# Patient Record
Sex: FEMALE | Race: BLACK OR AFRICAN AMERICAN | Employment: FULL TIME | URBAN - METROPOLITAN AREA
[De-identification: names, ages, dates, MRNs, and addresses within clinical notes are randomized per-mention and may not be internally consistent; named-entity substitution may affect disease eponyms.]

---

## 2018-01-12 ENCOUNTER — HOSPITAL ENCOUNTER (OUTPATIENT)
Dept: LAB | Age: 43
Discharge: HOME | End: 2018-01-12
Payer: COMMERCIAL

## 2018-01-12 ENCOUNTER — HOSPITAL ENCOUNTER (OUTPATIENT)
Dept: NON INVASIVE DIAGNOSTICS | Age: 43
Discharge: HOME | End: 2018-01-12
Payer: COMMERCIAL

## 2018-01-12 DIAGNOSIS — E66.01 MORBID OBESITY (HCC): ICD-10-CM

## 2018-01-12 LAB
25(OH)D3 SERPL-MCNC: 7.8 NG/ML (ref 30–100)
ALBUMIN SERPL-MCNC: 3.9 G/DL (ref 3.4–5)
ALBUMIN/GLOB SERPL: 1.1 {RATIO} (ref 0.8–1.7)
ALP SERPL-CCNC: 52 U/L (ref 45–117)
ALT SERPL-CCNC: 22 U/L (ref 13–56)
ANION GAP SERPL CALC-SCNC: 10 MMOL/L (ref 3–18)
AST SERPL-CCNC: 14 U/L (ref 15–37)
ATRIAL RATE: 81 BPM
BILIRUB SERPL-MCNC: 0.4 MG/DL (ref 0.2–1)
BUN SERPL-MCNC: 12 MG/DL (ref 7–18)
BUN/CREAT SERPL: 20 (ref 12–20)
CALCIUM SERPL-MCNC: 9.1 MG/DL (ref 8.5–10.1)
CALCULATED P AXIS, ECG09: 56 DEGREES
CALCULATED R AXIS, ECG10: 54 DEGREES
CALCULATED T AXIS, ECG11: 53 DEGREES
CHLORIDE SERPL-SCNC: 104 MMOL/L (ref 100–108)
CHOLEST SERPL-MCNC: 211 MG/DL
CO2 SERPL-SCNC: 27 MMOL/L (ref 21–32)
CREAT SERPL-MCNC: 0.61 MG/DL (ref 0.6–1.3)
DIAGNOSIS, 93000: NORMAL
ERYTHROCYTE [DISTWIDTH] IN BLOOD BY AUTOMATED COUNT: 15.2 % (ref 11.6–14.5)
EST. AVERAGE GLUCOSE BLD GHB EST-MCNC: 120 MG/DL
GLOBULIN SER CALC-MCNC: 3.7 G/DL (ref 2–4)
GLUCOSE SERPL-MCNC: 76 MG/DL (ref 74–99)
HBA1C MFR BLD: 5.8 % (ref 4.5–5.6)
HCT VFR BLD AUTO: 36.7 % (ref 35–45)
HDLC SERPL-MCNC: 49 MG/DL (ref 40–60)
HDLC SERPL: 4.3 {RATIO} (ref 0–5)
HGB BLD-MCNC: 12.1 G/DL (ref 12–16)
LDLC SERPL CALC-MCNC: 153.4 MG/DL (ref 0–100)
LIPID PROFILE,FLP: ABNORMAL
MAGNESIUM SERPL-MCNC: 1.8 MG/DL (ref 1.6–2.6)
MCH RBC QN AUTO: 27.8 PG (ref 24–34)
MCHC RBC AUTO-ENTMCNC: 33 G/DL (ref 31–37)
MCV RBC AUTO: 84.2 FL (ref 74–97)
P-R INTERVAL, ECG05: 150 MS
PHOSPHATE SERPL-MCNC: 4.1 MG/DL (ref 2.5–4.9)
PLATELET # BLD AUTO: 311 K/UL (ref 135–420)
PMV BLD AUTO: 8.6 FL (ref 9.2–11.8)
POTASSIUM SERPL-SCNC: 3.9 MMOL/L (ref 3.5–5.5)
PROT SERPL-MCNC: 7.6 G/DL (ref 6.4–8.2)
Q-T INTERVAL, ECG07: 390 MS
QRS DURATION, ECG06: 78 MS
QTC CALCULATION (BEZET), ECG08: 453 MS
RBC # BLD AUTO: 4.36 M/UL (ref 4.2–5.3)
SODIUM SERPL-SCNC: 141 MMOL/L (ref 136–145)
TRIGL SERPL-MCNC: 43 MG/DL (ref ?–150)
TSH SERPL DL<=0.05 MIU/L-ACNC: 0.49 UIU/ML (ref 0.36–3.74)
URATE SERPL-MCNC: 4.5 MG/DL (ref 2.6–7.2)
VENTRICULAR RATE, ECG03: 81 BPM
VLDLC SERPL CALC-MCNC: 8.6 MG/DL
WBC # BLD AUTO: 10.5 K/UL (ref 4.6–13.2)

## 2018-01-12 PROCEDURE — 84443 ASSAY THYROID STIM HORMONE: CPT | Performed by: NURSE PRACTITIONER

## 2018-01-12 PROCEDURE — 83036 HEMOGLOBIN GLYCOSYLATED A1C: CPT | Performed by: NURSE PRACTITIONER

## 2018-01-12 PROCEDURE — 84550 ASSAY OF BLOOD/URIC ACID: CPT | Performed by: NURSE PRACTITIONER

## 2018-01-12 PROCEDURE — 83735 ASSAY OF MAGNESIUM: CPT | Performed by: NURSE PRACTITIONER

## 2018-01-12 PROCEDURE — 84100 ASSAY OF PHOSPHORUS: CPT | Performed by: NURSE PRACTITIONER

## 2018-01-12 PROCEDURE — 36415 COLL VENOUS BLD VENIPUNCTURE: CPT | Performed by: NURSE PRACTITIONER

## 2018-01-12 PROCEDURE — 85027 COMPLETE CBC AUTOMATED: CPT | Performed by: NURSE PRACTITIONER

## 2018-01-12 PROCEDURE — 82306 VITAMIN D 25 HYDROXY: CPT | Performed by: NURSE PRACTITIONER

## 2018-01-12 PROCEDURE — 80053 COMPREHEN METABOLIC PANEL: CPT | Performed by: NURSE PRACTITIONER

## 2018-01-12 PROCEDURE — 80061 LIPID PANEL: CPT | Performed by: NURSE PRACTITIONER

## 2018-01-12 PROCEDURE — 93005 ELECTROCARDIOGRAM TRACING: CPT

## 2018-01-16 RX ORDER — ERGOCALCIFEROL 1.25 MG/1
50000 CAPSULE ORAL
Qty: 52 CAP | Refills: 0 | Status: SHIPPED | OUTPATIENT
Start: 2018-01-16 | End: 2019-01-09

## 2018-01-25 ENCOUNTER — OFFICE VISIT (OUTPATIENT)
Dept: SURGERY | Age: 43
End: 2018-01-25

## 2018-01-25 VITALS
RESPIRATION RATE: 16 BRPM | HEIGHT: 63 IN | WEIGHT: 260.25 LBS | HEART RATE: 93 BPM | DIASTOLIC BLOOD PRESSURE: 70 MMHG | SYSTOLIC BLOOD PRESSURE: 119 MMHG | BODY MASS INDEX: 46.11 KG/M2 | OXYGEN SATURATION: 100 %

## 2018-01-25 DIAGNOSIS — E66.01 MORBID OBESITY (HCC): Primary | ICD-10-CM

## 2018-01-25 DIAGNOSIS — E66.01 MORBID OBESITY WITH BMI OF 45.0-49.9, ADULT (HCC): ICD-10-CM

## 2018-01-25 DIAGNOSIS — E55.9 VITAMIN D DEFICIENCY: ICD-10-CM

## 2018-01-25 RX ORDER — BISMUTH SUBSALICYLATE 262 MG
1 TABLET,CHEWABLE ORAL DAILY
COMMUNITY

## 2018-01-25 NOTE — PROGRESS NOTES
VLCD Progress Note: Initial Physician Visit    Phill Wong is a 40 y.o. female who is here for medical screening for the VLCD Program.      Weight History  Current weight 260 pounds Body mass index is 46.1 kg/(m^2). Goal weight 190 pounds  Highest weight 32, at 30years old    Weight loss History  How many weight loss attempts have you had?  'several over th years'  Which program were you most successful at? [address], but did not care for jitteriness from phentermine    Significant Medical History  MI w/ in the last 3 months: no  Type I Diabetes: no  Type II Diabetes: no  Liver or Kidney disease requiring protein restriction: no  Pregnant or planning on becoming pregnant w/in 6 months: no  Recent treatment for Cancer: no, cancer free 4 years; hx of colon ca  History of Uric-acid Kidney Stone or elevated Uric Acid: no  Peptic ulcer disease not well controlled: no  Recent onset of Inflammatory Bowel Disease: no    If female:  No LMP recorded.     Significant Psychosocial History  Major lifestyle changes: yes, recent separation   Other commitments: no   Any potential unsupportive: no     Current psychotherapy: no  Ever hospitalized for psychiatric reasons: no  History of depression: no  History of suicidal ideation: no  Dramatic mood changes during dieting: no  History of binge eating: no  If yes, is there a history of purging: no    Social History  Social History   Substance Use Topics    Smoking status: Never Smoker    Smokeless tobacco: Never Used    Alcohol use No       Has Marni ever been told by a physician not to exercise: no    Does Marni know of any reason they shouldn't exercise: no  If yes, why? NA    Does Marni have any food allergies or sensitivities: yes , tree nuts and peanuts  Allergies include:    Allergies   Allergen Reactions    Latex, Natural Rubber Rash    Amoxicillin Rash and Itching    Biaxin [Clarithromycin] Rash    Nuts [Tree Nut] Itching and Sneezing Objective    Visit Vitals    /70 (BP 1 Location: Right arm, BP Patient Position: Sitting)    Pulse 93    Resp 16    Ht 5' 3\" (1.6 m)    Wt 118 kg (260 lb 4 oz)    SpO2 100%    BMI 46.1 kg/m2     Appearance: Obese, A&O x 3, NAD  HEENT:  NC/AT, PERRL  Neck:  No nodes, no JVD  Heart:  RRR without M/R/G  Lungs:  CTAB, no rhonchi, rales, or wheezes with good air exchange   Abdomen:  Non-tender, pos bowel sounds, no hepatosplenomegally  Ext:  No C/C/E      Labs    Labs reviewed extensively with patient, will follow up with PCP concerning rechecking thryoid function in 6-12 months. Will continue with monthly CMP, uric acid checks    CBC:   Lab Results   Component Value Date/Time    WBC 10.5 01/12/2018 02:13 PM    RBC 4.36 01/12/2018 02:13 PM    HGB 12.1 01/12/2018 02:13 PM    HCT 36.7 01/12/2018 02:13 PM    PLATELET 346 48/17/2527 02:13 PM     CMP:   Lab Results   Component Value Date/Time    Glucose 76 01/12/2018 01:52 PM    Sodium 141 01/12/2018 01:52 PM    Potassium 3.9 01/12/2018 01:52 PM    Chloride 104 01/12/2018 01:52 PM    CO2 27 01/12/2018 01:52 PM    BUN 12 01/12/2018 01:52 PM    Creatinine 0.61 01/12/2018 01:52 PM    Calcium 9.1 01/12/2018 01:52 PM    Anion gap 10 01/12/2018 01:52 PM    BUN/Creatinine ratio 20 01/12/2018 01:52 PM    Alk.  phosphatase 52 01/12/2018 01:52 PM    Protein, total 7.6 01/12/2018 01:52 PM    Albumin 3.9 01/12/2018 01:52 PM    Globulin 3.7 01/12/2018 01:52 PM    A-G Ratio 1.1 01/12/2018 01:52 PM      Lab Results   Component Value Date/Time    Hemoglobin A1c 5.8 01/12/2018 01:57 PM    Glucose 76 01/12/2018 01:52 PM    LDL, calculated 153.4 01/12/2018 01:52 PM    Creatinine 0.61 01/12/2018 01:52 PM      Lab Results   Component Value Date/Time    Cholesterol, total 211 01/12/2018 01:52 PM    HDL Cholesterol 49 01/12/2018 01:52 PM    LDL, calculated 153.4 01/12/2018 01:52 PM    Triglyceride 43 01/12/2018 01:52 PM    CHOL/HDL Ratio 4.3 01/12/2018 01:52 PM     Lab Results Component Value Date/Time    TSH 0.49 01/12/2018 01:52 PM      Magnesium 1.8  Vit D 7.8      Assessment & Plan  Encounter Diagnoses   Name Primary?  Morbid obesity ([facility] [address] Yes    Morbid obesity with BMI of 45.0-49.9, adult ([facility]     Vitamin D deficiency        1. Labs reviewed w/ patient    2. EKG reviewed w/ patient:   Personally reviewed by me, NSR, no abnormalities,    QTc = 453 sec (Upper limit is 440 for males & 460 for females)    3. Medication changes include: add twice weekly vit D    4. Based on history, labs and EKG, Marni Lindsay is now enrolled for the [facility] Program.     5.  Individualized Patient Plan is as follows:   Diet Regimen: 2 Meal Replacements daily with 500-600 calorie meal. Pt is interested in LCD program at this time, can advance to VLCD if becomes interested in doing so. Weekly BP/Weight checks   Monthly provider appointments       25 min of the 45 minutes face to face time with Marni consisted of counseling & coordinating and/or discussing treatment plans in reference to her above mentioned diagnoses.

## 2018-01-25 NOTE — PATIENT INSTRUCTIONS
Homework for [employer]    2 meal replacements a day. 500-600 calorie dinner. Gelatin for night. First one within about 1 hour of waking up to get metabolism started. Don't go more than 6 hours between meals. No more than 1 soup a day- too much salt  No more than 1 bar a day- not enough protein.       Total calorie for day 1000 calories  Limit carbs to max 50g    Additional Tips    - Consume your meals equally spaced over the day     - Get the support of family and friends    - Snack-proof your house    - Have strategies for social situations, meetings that run over or vacation      - When you fall off the plan it's no big deal; just start right back. Turn those days into examples of what to change or avoid next time. Exercise    Exercise daily   - Start slow, gradually increase your time by around 10 to 20 % a week    - To prevent injury, take a recovery week every 4 weeks (reduce your exercise time and intensity by 1/2 during this time)    - Your goal is to work up to 150 min a week; hard enough that you can't whistle or sing. It may take 6 months to work up to this. - Can consider [employer] evaluation for sports performance/fitness coaching (call [phone] at [facility])      Common Side Effects   (In order of how common)    -Fatigue  -Hunger  -Constipation  -Low sodium  -Hair Loss      1. Fatigue  Everyone goes through this stage  It's normal  It lasts 10 - 14 days  It goes away  It's your body adjusting to the Ketogenic diet and it's normal       2. Hunger  More common in the first few days  After your body adjusts to the Ketogenic diet it will go away       3. Constipation   -Drink at least 64 oz of non-calorie fluid a day  -Add fiber (at least 20 grams a day)     1. Get the New Direction products with fiber added     2.  Use SUGAR-FREE products: Fiber One products, Benefiber or Metamucil    If you're prone to constipation: Take a Stool Softener every day.     (eg - Dulcolax Stool Softener 100 mg or Miralax)    If you get constipated:     1. Start with a Stool Softener (produces a bowel movement in 72 hr):   Examples:      Miralax 1 capful twice a day (It's OK to go up to 3 caps for a few days)      Dulcolax Stool Softener 100 mg       2. Next: If you still have constipation after 2 or 3 days, add a Stimulant          Laxative (this will produce a bowel movement in 6 - 12 hr):   Examples:      Exlax (1 small square) for up to 4 days      Dulcolax stimulant laxative (8.25 mg)       Magnesium citrate pill 500 mg 3 - 4 pills a day       3. Or you can go straight to a combination Stool Softner / Stimulant at night. If  you need, you can add a morning dose. Examples:      Pericolace 50 mg / 8.25 mg      Sennakot-S  50 mg / 8.25 mg       4. Finally If You're Really locked up, get Liquid Magnesium Citrate (take  according to the directions)      4. Low blood levels of sodium  -Not very common, especially if you're not taking a diuretic (fluid pill)  If you develop a headache, feel fatigued, light-headed or dizzy    Drink 1/2 cup of bouillon broth up to twice a day until you feel normal      5. Hair loss  -This is fairly uncommon; you may see more than a usual amount of hair in your brush or the shower drain  This can happen with any physical stress (surgery, trauma or calorie restriction) or psychological stress. Although is it very concerning, it is often temporary and will resolve within 3 months. Some people notice a benefit from taking a daily dose of Biotin 2,500 mcg and increasing their Fish Oil intake. Books to [address] [place] [address]    1. Change Anything: The [employer] of Personal Success  by Martha Ronquillo, Shakira Schrader, and Coni Schaffer    2. Mindless Eating  by Salina Villarreal    3. Perfectly Yourself  by Radha Sepulveda    4.  Me, Myself and I - 28 Days to Creative Self-Love  by Damion Japanese version only    Note: Reading these books is a small but significant investment in yourself. Merely following the diet will reliably result in weight loss. However, revising how you respond to stressful situations, how you relate to food and, your commitment to self-care (adequate sleep, exercise & daily reflection) is the ONLY way to protect your weight loss and free yourself from your patterns that lead to weight gain. Long-term Healthy Weight / Body Fat  Different ways to determining your ideal weight:  1. Keep your waist to less than 1/2 of your height   2. Keep your % body fat to under 30% for female and under 20% if male  3. Keep your BMI around 25          Supplements      1. Vitacost Synergy Basic Multi-Vitamin (Their In-House Brand)      Take 1 capsule twice a day        Found ONLY at [facility], NOT at [facility], [place], [facility], the Vitamin Shoppe, etc      SKU #: [id number]       $16.49   / 1 month supply      www. [employer]  [phone]         3. Turmeric with Black Pepper Extract (or Bioperine) 500 mg      1 pill 1-2 times a day (more is joint pain or increased inflammation)      Found at many stores but [employer] has a good price:      SKU #: [id number]  $13.64 / 60 pills      www. [employer]  [phone]       4. Probiotic: 15-35 (35 billion CFU)         1 capsule twice a day for 2 weeks, then 3 days a week       SKU #: [id number]  $27.99 / 120 capsules       www. [employer]  [phone]       5.  Fish Oil      Take 1 capsule daily     [employer] Brand: \"Omega 3 Fish Oil (per pill:  )\"        OR      Take 1 Tbsp 3 days a week of any of the following:     [employer] Brand: Lemon or Orange flavored Fish Oil   Nutra Sea + D:  Apple flavored   Nutra Sea:  Jeremy flavored   (These are found at most Vitamin Stores or on [employer])                          FYI:   Typical fish oil per pill =  mg and  mg  Krill oil per pill = EPA 50 - 70 mg and DHA 27 - 250 mg ^^^^^^^^^^^^^^^^^^^^^^^^^^^^^^^^^^^^^^^^^^^^^^^^^^^^^^^^^^^^^^^^^^^^^^^^^^^^^^^^^^      Carbohydrates     If you do not metabolize carbohydrates well, you will lose weight and keep the weigh off by keeping your carbohydrate intake low. How do you know if you do not metabolize carbs well? If your Triglycerides, sdLCL-C and Insulin Resistance are elevated, then you will do well to follow a low carb diet. Fun Facts About Carbs    - The Typical American Diet = 450 grams a day    - The Reducing Phase of the VLCD = 40 grams a day    - Target for weight loss maintenance:   - Eat no more than 30 grams per meal   - Eat no more than 10 grams per snack (mid-morning and mid-afternoon snack)        Resources for finding out where carbs hide in our foods:  1. Our Registered Dietitians    2. Www. Atkins. com/tools    3. Read food labels    4. Books       - The Calorie Raffaele Morgan       - The Pablo System Diet for a New You       - Vivien Stanley's NEW Carb and Calorie 4th Edition (my favorite)    5. Smart phone and Internet-based apps       - Unafinance        - Carb Manager      If you want to get a better picture of your cardiac risk, consider getting a coronary calcium score:  Call [phone] to schedule one. Compliance    We do not expect perfection. However, we do ask for your persistence and your willingness to do the work of growing yourself. You will hit plateaus in your weight loss. You will run into situations that test your will power. You will encounter times when you feel frustrated. It's OK if you slip up from time to time. However, how you respond to your slip ups and, the adjustments you make to prevent future slip ups will determine you long-term success. If you find yourself temporarily slipping in the program, it's OK. We will gently nudge you forward and encourage you to do the work of identifying and moving beyond your stuck areas.   However, if you find yourself wavering in the program for a prolonged time (more than 3 or 4 months) we will ask that you take a break from the program.  At that time you will 3 options:     1. Explore additional weight loss options. 2. Work with a counselor to do some focused work in order to identify and break the patterns that are holding you back. 3.  The combination of both these. Once you, your counselor and/or your provider feel that you have moved past those patterns and want to give the program another chance, we will gladly work with you to determine if you're ready to start back in the program.    When returning after a break, if it's been less than 3 months, you do not need to repeat an orientation, labs or an EKG. If it's over been 3 months you will required to repeat an orientation and, if greater than 6 months, you will be required to repeat an orientation, labs and an EKG.

## 2018-01-25 NOTE — MR AVS SNAPSHOT
[address] [facility] 
 
 
 [facility] [address] [facility] 
[phone] Patient: Angelita Gregory MRN: [id number] :1975 Visit Information Date & Time Provider Department Dept. Phone Encounter #  
 2018  4:30 PM Radha Gomez NP [facility] [phone] [id number] Your Appointments 2018  7:30 AM  
Office Visit with JULIANE VAN VISIT2 [facility] ([address]) Appt Note: [facility]  
 [id number] [address] [facility]  
  
   
 [address] [place] [address] Upcoming Health Maintenance Date Due Pneumococcal 19-64 Highest Risk (1 of 3 - PCV13) 1995 DTaP/Tdap/Td series (1 - Tdap) 1997 PAP AKA CERVICAL CYTOLOGY 1997 Influenza Age 5 to Adult 2017 Allergies as of 2018  Review Complete On: 3/9/2017 By: Nury Diggs LPN Severity Noted Reaction Type Reactions Latex, Natural Rubber  2016    Rash Amoxicillin  2016    Rash, Itching Biaxin [Clarithromycin]  2016    Rash Nuts [Tree Nut]  2016    Itching, Sneezing Current Immunizations  Never Reviewed No immunizations on file. Not reviewed this visit Vitals OB Status Smoking Status Having regular periods Never Smoker Your Updated Medication List  
  
   
This list is accurate as of: 18  4:55 PM.  Always use your most recent med list.  
  
  
  
  
 BIOTIN PO Take  by mouth.  
  
 ergocalciferol 50,000 unit capsule Commonly known as:  VITAMIN D2 Take 1 Cap by mouth every seven (7) days for 52 doses. VITAMIN B-12 PO Take  by mouth. VITAMIN D3 PO Take  by mouth. Patient Instructions Homework for [employer] 
 
2 meal replacements a day. 500-600 calorie dinner. Gelatin for night. First one within about 1 hour of waking up to get metabolism started. Don't go more than 6 hours between meals. No more than 1 soup a day- too much salt No more than 1 bar a day- not enough protein.  
   
Total calorie for day 1000 calories Limit carbs to max 50g Additional Tips 
 
- Consume your meals equally spaced over the day - Get the support of family and friends 
 
- Snack-proof your house - Have strategies for social situations, meetings that run over or vacation - When you fall off the plan it's no big deal; just start right back. Turn those days into examples of what to change or avoid next time. Exercise Exercise daily  
- Start slow, gradually increase your time by around 10 to 20 % a week - To prevent injury, take a recovery week every 4 weeks (reduce your exercise time and intensity by 1/2 during this time) - Your goal is to work up to 150 min a week; hard enough that you can't whistle or sing. It may take 6 months to work up to this. - Can consider [employer] evaluation for sports performance/fitness coaching (call [phone] at [facility]) Common Side Effects (In order of how common) -Fatigue 
-Hunger 
-Constipation 
-Low sodium 
-Hair Loss 1. Fatigue Everyone goes through this stage It's normal 
It lasts 10 - 14 days It goes away It's your body adjusting to the Ketogenic diet and it's normal  
 
 
2. Hunger More common in the first few days After your body adjusts to the Ketogenic diet it will go away 3. Constipation  
-Drink at least 64 oz of non-calorie fluid a day 
-Add fiber (at least 20 grams a day) 1. Get the New Direction products with fiber added 2. Use SUGAR-FREE products: Fiber One products, Benefiber or Metamucil If you're prone to constipation: Take a Stool Softener every day. 
   (eg - Dulcolax Stool Softener 100 mg or Miralax) If you get constipated: 1. Start with a Stool Softener (produces a bowel movement in 72 hr): 
 Examples: 
    Miralax 1 capful twice a day (It's OK to go up to 3 caps for a few days) Dulcolax Stool Softener 100 mg 2. Next: If you still have constipation after 2 or 3 days, add a Stimulant Laxative (this will produce a bowel movement in 6 - 12 hr): 
 Examples: 
    Exlax (1 small square) for up to 4 days Dulcolax stimulant laxative (8.25 mg) Magnesium citrate pill 500 mg 3 - 4 pills a day 3. Or you can go straight to a combination Stool Softner / Stimulant at night. If  you need, you can add a morning dose. Examples: 
    Pericolace 50 mg / 8.25 mg Sennakot-S  50 mg / 8.25 mg 
 
   4. Finally If You're Really locked up, get Liquid Magnesium Citrate (take  according to the directions) 4. Low blood levels of sodium 
-Not very common, especially if you're not taking a diuretic (fluid pill) If you develop a headache, feel fatigued, light-headed or dizzy Drink 1/2 cup of bouillon broth up to twice a day until you feel normal 
 
 
5. Hair loss 
-This is fairly uncommon; you may see more than a usual amount of hair in your brush or the shower drain This can happen with any physical stress (surgery, trauma or calorie restriction) or psychological stress. Although is it very concerning, it is often temporary and will resolve within 3 months. Some people notice a benefit from taking a daily dose of Biotin 2,500 mcg and increasing their Fish Oil intake. Books to [address] [place] [address] 1. Change Anything: The [employer] of Personal Success 
by Ilya Mars, Gerardo Rico, and Adolfo Nugent 2. Mindless Eating 
by Dee Carbajal 3. Perfectly Yourself 
by Collin Reynoso 4. Me, Myself and I - 28 Days to Creative Self-Love 
by Brady Gibbs version only Note: Reading these books is a small but significant investment in yourself. Merely following the diet will reliably result in weight loss. However, revising how you respond to stressful situations, how you relate to food and, your commitment to self-care (adequate sleep, exercise & daily reflection) is the ONLY way to protect your weight loss and free yourself from your patterns that lead to weight gain. Long-term Healthy Weight / Body Fat Different ways to determining your ideal weight: 
1. Keep your waist to less than 1/2 of your height 2. Keep your % body fat to under 30% for female and under 20% if male 3. Keep your BMI around 25 Supplements 1. Vitacost Synergy Basic Multi-Vitamin (Their In-House Brand) Take 1 capsule twice a day Found ONLY at [facility], NOT at [facility], [facility], the Vitamin Shoppe, etc 
    SKU #: [id number]  
    $16.49   / 1 month supply 
    www. [employer]itaCost.com  [phone] 3. Turmeric with Black Pepper Extract (or Bioperine) 500 mg 
    1 pill 1-2 times a day (more is joint pain or increased inflammation) Found at many stores but [address] has a good price: 
    SKU #: [id number]  $13.64 / 61 pills 
    www. [employer]  [phone] 4. Probiotic: 15-35 (35 billion CFU) 1 capsule twice a day for 2 weeks, then 3 days a week SKU #: [id number]  $27.99 / 120 capsules 
     www. [employer]  [phone] 5. Fish Oil Take 1 capsule daily [facility] [employer] Brand: \"Omega 3 Fish Oil (per pill:  )\" OR Take 1 Tbsp 3 days a week of any of the following: 
  
Vit[employer] Brand: Lemon or Orange flavored Fish Oil Nutra Sea + D:  Apple flavored Nutra Sea:  Jeremy flavored (These are found at most Vitamin Stores or on [employer]) 
                     
 
FYI:  
Typical fish oil per pill =  mg and  mg 
Krill oil per pill = EPA 50 - 70 mg and DHA 27 - 250 mg 
 
         
 
 ^^^^^^^^^^^^^^^^^^^^^^^^^^^^^^^^^^^^^^^^^^^^^^^^^^^^^^^^^^^^^^^^^^^^^^^^^^^^^^^^^^ Carbohydrates If you do not metabolize carbohydrates well, you will lose weight and keep the weigh off by keeping your carbohydrate intake low. How do you know if you do not metabolize carbs well? If your Triglycerides, sdLCL-C and Insulin Resistance are elevated, then you will do well to follow a low carb diet. Fun Facts About Carbs - The Typical American Diet = 450 grams a day - The Reducing Phase of the VLCD = 40 grams a day - Target for weight loss maintenance: 
 - Eat no more than 30 grams per meal 
 - Eat no more than 10 grams per snack (mid-morning and mid-afternoon snack) Resources for finding out where carbs hide in our foods: 
1. Our Registered Dietitians 2. Www. Atkins. com/tools 3. Read food labels 4. Books - The Calorie Malorie Breen - The New Atkins Diet for a New You 
     - Vivien Stanley's NEW Carb and Calorie 4th Edition (my favorite) 5. Smart phone and Internet-based apps - TrueLens  
     - Carb Manager If you want to get a better picture of your cardiac risk, consider getting a coronary calcium score:  Call [phone] to schedule one. Compliance We do not expect perfection. However, we do ask for your persistence and your willingness to do the work of growing yourself. You will hit plateaus in your weight loss. You will run into situations that test your will power. You will encounter times when you feel frustrated. It's OK if you slip up from time to time. However, how you respond to your slip ups and, the adjustments you make to prevent future slip ups will determine you long-term success. If you find yourself temporarily slipping in the program, it's OK. We will gently nudge you forward and encourage you to do the work of identifying and moving beyond your stuck areas.   However, if you find yourself wavering in the program for a prolonged time (more than 3 or 4 months) we will ask that you take a break from the program.  At that time you will 3 options:  
 
1. Explore additional weight loss options. 2. Work with a counselor to do some focused work in order to identify and break the patterns that are holding you back. 3.  The combination of both these. Once you, your counselor and/or your provider feel that you have moved past those patterns and want to give the program another chance, we will gladly work with you to determine if you're ready to start back in the program. 
 
When returning after a break, if it's been less than 3 months, you do not need to repeat an orientation, labs or an EKG. If it's over been 3 months you will required to repeat an orientation and, if greater than 6 months, you will be required to repeat an orientation, labs and an EKG. Introducing [facility]! [address] [facility] introduces [employer] patient portal. Now you can access parts of your medical record, email your doctor's office, and request medication refills online. 1. In your internet browser, go to [email or web address] 2. Click on the First Time User? Click Here link in the Sign In box. You will see the New Member Sign Up page. 3. Enter your [employer] Access Code exactly as it appears below. You will not need to use this code after youve completed the sign-up process. If you do not sign up before the expiration date, you must request a new code. · [employer] Access Code: [email or web address] Expires: 4/12/2018  1:31 PM 
 
4. Enter the last four digits of your Social Security Number (xxxx) and Date of Birth (mm/dd/yyyy) as indicated and click Submit. You will be taken to the next sign-up page. 5. Create a Yglet ID. This will be your Yglet login ID and cannot be changed, so think of one that is secure and easy to remember. 6. Create a [employer] password. You can change your password at any time. 7. Enter your Password Reset Question and Answer. This can be used at a later time if you forget your password. 8. Enter your e-mail address. You will receive e-mail notification when new information is available in [address] 9. Click Sign Up. You can now view and download portions of your medical record. 10. Click the Download Summary menu link to download a portable copy of your medical information. If you have questions, please visit the Frequently Asked Questions section of the [employer] website. Remember, [employer] is NOT to be used for urgent needs. For medical emergencies, dial 911. Now available from your iPhone and Android! Please provide this summary of care documentation to your next provider. Your primary care clinician is listed as Vivian Norton. If you have any questions after today's visit, please call [phone].

## 2018-01-31 ENCOUNTER — OFFICE VISIT (OUTPATIENT)
Dept: SURGERY | Age: 43
End: 2018-01-31

## 2018-01-31 DIAGNOSIS — E66.01 MORBID OBESITY WITH BMI OF 45.0-49.9, ADULT (HCC): Primary | ICD-10-CM

## 2018-02-02 VITALS
SYSTOLIC BLOOD PRESSURE: 131 MMHG | DIASTOLIC BLOOD PRESSURE: 67 MMHG | HEIGHT: 63 IN | OXYGEN SATURATION: 97 % | BODY MASS INDEX: 46.53 KG/M2 | WEIGHT: 262.6 LBS | HEART RATE: 83 BPM

## 2018-02-02 NOTE — PROGRESS NOTES
Progress Note: Weekly Medical Monitoring in the [facility] Weight Loss Program    Is there anything that you or the patient needs to let the supervising provider know about? no    Over the past week, have you experienced any side-effects? Yes, patient states she has experienced weakness, fatigue, lightheadedness, and headache    Claude Rumpf is a 40 y.o. female who is enrolled in [facility]    Marnijey Johnsonm was prescribed the VLCD / LCD - week 2. Visit Vitals    /67 (BP 1 Location: Right arm)    Pulse 83    Ht 5' 3\" (1.6 m)    Wt 119.1 kg (262 lb 9.6 oz)    SpO2 97%    BMI 46.52 kg/m2     Weight Metrics 1/31/2018 1/25/2018 1/25/2018 11/28/2016 11/28/2016 10/24/2016 9/27/2016   Weight 262 lb 9.6 oz - 260 lb 4 oz 252 lb 253 lb 12.8 oz 257 lb 259 lb   Body Fat % - 46.7 - - - - -   BMI 46.52 kg/m2 - 46.1 kg/m2 43.26 kg/m2 43.56 kg/m2 44.11 kg/m2 44.46 kg/m2         Have you received any other medical care this week? no  If yes, where and for what? Have you had any change in your medications since your last visit? yes  If yes what? Added calcium + zinc + magnesium supplement     Did you have any problems adhering to the program last week? no  If yes, please explain:       Eating Habits Over Last Week:  Did you take in 64 oz of non-caloric fluids?  Patient is consuming between 30-50 ounces of non-caloric fluid daily      Did you consume your prescribed meal replacement regimen each day? yes   M: 2 products, steak and vegetables  T: 3 products, chicken and vegetables  W: 2 products, tortilla chips with guacamole  T: 2 products, stir-pascual  F: 2 products, baked chicken and fruit water  S: 2 products, strawberry lemonade, shaved steak and spinach  S: 2 products, sliced roast beef       Physical Activity Over the Past Week:    Aerobic exercise: 0 min  Resistance exercise: 0 workouts / week

## 2018-02-05 NOTE — PROGRESS NOTES
Nurse note from patient's weekly VLCD / LCD / Maintenance class was reviewed. Pertinent medical concerns were:   none     BP Readings from Last 3 Encounters:   02/02/18 131/67   01/25/18 119/70   11/28/16 117/69       Weight Metrics 1/31/2018 1/25/2018 1/25/2018 11/28/2016 11/28/2016 10/24/2016 9/27/2016   Weight 262 lb 9.6 oz - 260 lb 4 oz 252 lb 253 lb 12.8 oz 257 lb 259 lb   Body Fat % - 46.7 - - - - -   BMI 46.52 kg/m2 - 46.1 kg/m2 43.26 kg/m2 43.56 kg/m2 44.11 kg/m2 44.46 kg/m2       Current Outpatient Prescriptions   Medication Sig Dispense Refill    B.infantis-B.ani-B.long-B.bifi (PROBIOTIC 4X) 10-15 mg TbEC Take  by mouth.  multivitamin (ONE A DAY) tablet Take 1 Tab by mouth daily.  ergocalciferol (VITAMIN D2) 50,000 unit capsule Take 1 Cap by mouth every seven (7) days for 52 doses.  (Patient taking differently: Take 50,000 Units by mouth two (2) times a week.) 52 Cap 0     Plan:  Continue current regimen

## 2018-02-09 ENCOUNTER — CLINICAL SUPPORT (OUTPATIENT)
Dept: SURGERY | Age: 43
End: 2018-02-09

## 2018-02-09 VITALS
BODY MASS INDEX: 45.87 KG/M2 | HEART RATE: 88 BPM | HEIGHT: 63 IN | OXYGEN SATURATION: 100 % | DIASTOLIC BLOOD PRESSURE: 68 MMHG | SYSTOLIC BLOOD PRESSURE: 122 MMHG | WEIGHT: 258.9 LBS

## 2018-02-09 DIAGNOSIS — E66.01 MORBID OBESITY WITH BMI OF 45.0-49.9, ADULT (HCC): Primary | ICD-10-CM

## 2018-02-13 NOTE — PROGRESS NOTES
Nurse note from patient's weekly VLCD / LCD / Maintenance class was reviewed. Pertinent medical concerns were:   none     BP Readings from Last 3 Encounters:   02/09/18 122/68   02/02/18 131/67   01/25/18 119/70       Weight Metrics 2/9/2018 1/31/2018 1/25/2018 1/25/2018 11/28/2016 11/28/2016 10/24/2016   Weight 258 lb 14.4 oz 262 lb 9.6 oz - 260 lb 4 oz 252 lb 253 lb 12.8 oz 257 lb   Body Fat % - - 46.7 - - - -   BMI 45.86 kg/m2 46.52 kg/m2 - 46.1 kg/m2 43.26 kg/m2 43.56 kg/m2 44.11 kg/m2       Current Outpatient Prescriptions   Medication Sig Dispense Refill    B.infantis-B.ani-B.long-B.bifi (PROBIOTIC 4X) 10-15 mg TbEC Take  by mouth.  multivitamin (ONE A DAY) tablet Take 1 Tab by mouth daily.  ergocalciferol (VITAMIN D2) 50,000 unit capsule Take 1 Cap by mouth every seven (7) days for 52 doses.  (Patient taking differently: Take 50,000 Units by mouth two (2) times a week.) 52 Cap 0     Plan:  Continue current regimen, but will discuss considering increasing to VLCD

## 2018-02-15 ENCOUNTER — OFFICE VISIT (OUTPATIENT)
Dept: SURGERY | Age: 43
End: 2018-02-15

## 2018-02-15 DIAGNOSIS — E66.01 MORBID OBESITY WITH BMI OF 45.0-49.9, ADULT (HCC): Primary | ICD-10-CM

## 2018-02-16 VITALS
WEIGHT: 258.2 LBS | HEART RATE: 86 BPM | BODY MASS INDEX: 45.75 KG/M2 | HEIGHT: 63 IN | SYSTOLIC BLOOD PRESSURE: 118 MMHG | DIASTOLIC BLOOD PRESSURE: 72 MMHG

## 2018-02-16 NOTE — PROGRESS NOTES
Progress Note: Weekly Medical Monitoring in the [facility] Loss Program    Is there anything that you or the patient needs to let the supervising provider know about? no    Over the past week, have you experienced any side-effects? Yes - leg cramps     Marni Lindsay is a 40 y.o. female who is enrolled in [facility]    Marni Lindsay was prescribed the VLCD / LCD. Visit Vitals    /72 (BP 1 Location: Right arm)    Pulse 86    Ht 5' 3\" (1.6 m)    Wt 117.1 kg (258 lb 3.2 oz)    BMI 45.74 kg/m2     Weight Metrics 2/15/2018 2/9/2018 1/31/2018 1/25/2018 1/25/2018 11/28/2016 11/28/2016   Weight 258 lb 3.2 oz 258 lb 14.4 oz 262 lb 9.6 oz - 260 lb 4 oz 252 lb 253 lb 12.8 oz   Body Fat % - - - 46.7 - - -   BMI 45.74 kg/m2 45.86 kg/m2 46.52 kg/m2 - 46.1 kg/m2 43.26 kg/m2 43.56 kg/m2         Have you received any other medical care this week? no  If yes, where and for what? Have you had any change in your medications since your last visit? no  If yes what? Did you have any problems adhering to the program last week? yes  If yes, please explain: patient is experiencing increased hunger during the middle of the day        Eating Habits Over Last Week:  Did you take in 64 oz of non-caloric fluids? Patient is consuming 40 ounces of non-caloric fluid/d     Did you consume your prescribed meal replacement regimen each day?  yes       Physical Activity Over the Past Week:    Aerobic exercise: 15 min  Resistance exercise: 0 workouts / week

## 2018-02-19 NOTE — PROGRESS NOTES
Nurse note from patient's weekly VLCD / LCD / Maintenance class was reviewed. Pertinent medical concerns were:   none     BP Readings from Last 3 Encounters:   02/16/18 118/72   02/09/18 122/68   02/02/18 131/67       Weight Metrics 2/15/2018 2/9/2018 1/31/2018 1/25/2018 1/25/2018 11/28/2016 11/28/2016   Weight 258 lb 3.2 oz 258 lb 14.4 oz 262 lb 9.6 oz - 260 lb 4 oz 252 lb 253 lb 12.8 oz   Body Fat % - - - 46.7 - - -   BMI 45.74 kg/m2 45.86 kg/m2 46.52 kg/m2 - 46.1 kg/m2 43.26 kg/m2 43.56 kg/m2       Current Outpatient Prescriptions   Medication Sig Dispense Refill    B.infantis-B.ani-B.long-B.bifi (PROBIOTIC 4X) 10-15 mg TbEC Take  by mouth.  multivitamin (ONE A DAY) tablet Take 1 Tab by mouth daily.  ergocalciferol (VITAMIN D2) 50,000 unit capsule Take 1 Cap by mouth every seven (7) days for 52 doses.  (Patient taking differently: Take 50,000 Units by mouth two (2) times a week.) 52 Cap 0     Plan:  Continue current regimen

## 2018-03-06 ENCOUNTER — FACE TO FACE ENCOUNTER (OUTPATIENT)
Dept: SURGERY | Age: 43
End: 2018-03-06

## 2018-03-06 DIAGNOSIS — E66.01 MORBID OBESITY WITH BMI OF 45.0-49.9, ADULT (HCC): Primary | ICD-10-CM

## 2018-03-06 RX ORDER — DIETHYLPROPION HYDROCHLORIDE 25 MG/1
1 TABLET ORAL 2 TIMES DAILY
Qty: 60 TAB | Refills: 0 | Status: SHIPPED | OUTPATIENT
Start: 2018-03-06

## 2018-03-06 NOTE — PROGRESS NOTES
Pt struggling with afternoon hunger. Reviewed timing of meal replacements. WIll add short acting appeitie suppressant and monitor. SANTO Oh, FNP-BC

## 2018-03-23 ENCOUNTER — CLINICAL SUPPORT (OUTPATIENT)
Dept: SURGERY | Age: 43
End: 2018-03-23

## 2018-03-23 VITALS
SYSTOLIC BLOOD PRESSURE: 116 MMHG | HEART RATE: 76 BPM | HEIGHT: 63 IN | WEIGHT: 264.7 LBS | BODY MASS INDEX: 46.9 KG/M2 | DIASTOLIC BLOOD PRESSURE: 72 MMHG

## 2018-03-23 DIAGNOSIS — E66.01 MORBID OBESITY WITH BMI OF 45.0-49.9, ADULT (HCC): Primary | ICD-10-CM

## 2018-03-26 NOTE — PROGRESS NOTES
Nurse note from patient's weekly VLCD / LCD / Maintenance class was reviewed. Pertinent medical concerns were:   none     BP Readings from Last 3 Encounters:   03/23/18 116/72   02/16/18 118/72   02/09/18 122/68       Weight Metrics 3/23/2018 2/15/2018 2/9/2018 1/31/2018 1/25/2018 1/25/2018 11/28/2016   Weight 264 lb 11.2 oz 258 lb 3.2 oz 258 lb 14.4 oz 262 lb 9.6 oz - 260 lb 4 oz 252 lb   Body Fat % - - - - 46.7 - -   BMI 46.89 kg/m2 45.74 kg/m2 45.86 kg/m2 46.52 kg/m2 - 46.1 kg/m2 43.26 kg/m2       Current Outpatient Prescriptions   Medication Sig Dispense Refill    diethylpropion 25 mg tab Take 1 Tab by mouth two (2) times a day. Max Daily Amount: 2 Tabs. Indications: WEIGHT LOSS MANAGEMENT FOR OBESE PATIENT (BMI >= 30) 60 Tab 0    B.infantis-B.ani-B.long-B.bifi (PROBIOTIC 4X) 10-15 mg TbEC Take  by mouth.  multivitamin (ONE A DAY) tablet Take 1 Tab by mouth daily.  ergocalciferol (VITAMIN D2) 50,000 unit capsule Take 1 Cap by mouth every seven (7) days for 52 doses.  (Patient taking differently: Take 50,000 Units by mouth two (2) times a week.) 52 Cap 0     Plan:  Continue current plan

## 2018-03-30 ENCOUNTER — CLINICAL SUPPORT (OUTPATIENT)
Dept: SURGERY | Age: 43
End: 2018-03-30

## 2018-03-30 VITALS
SYSTOLIC BLOOD PRESSURE: 119 MMHG | HEIGHT: 63 IN | WEIGHT: 262.5 LBS | BODY MASS INDEX: 46.51 KG/M2 | HEART RATE: 76 BPM | DIASTOLIC BLOOD PRESSURE: 78 MMHG

## 2018-03-30 DIAGNOSIS — E66.01 MORBID OBESITY WITH BMI OF 45.0-49.9, ADULT (HCC): Primary | ICD-10-CM

## 2018-03-30 NOTE — PROGRESS NOTES
Progress Note: Weekly Medical Monitoring in the [facility] Loss Program    Is there anything that you or the patient needs to let the supervising provider know about? no    Over the past week, have you experienced any side-effects? no    Marni Lindsay is a 40 y.o. female who is enrolled in [facility]    Evangelista Lucas was prescribed the VLCD / LCD. Visit Vitals    /78    Pulse 76    Ht 5' 3\" (1.6 m)    Wt 119.1 kg (262 lb 8 oz)    BMI 46.5 kg/m2     Weight Metrics 3/30/2018 3/23/2018 2/15/2018 2/9/2018 1/31/2018 1/25/2018 1/25/2018   Weight 262 lb 8 oz 264 lb 11.2 oz 258 lb 3.2 oz 258 lb 14.4 oz 262 lb 9.6 oz - 260 lb 4 oz   Body Fat % - - - - - 46.7 -   BMI 46.5 kg/m2 46.89 kg/m2 45.74 kg/m2 45.86 kg/m2 46.52 kg/m2 - 46.1 kg/m2         Have you received any other medical care this week? no  If yes, where and for what? Have you had any change in your medications since your last visit? no  If yes what? Did you have any problems adhering to the program last week? yes  If yes, please explain: hunger during the day. Appetite suppressants do help for about 4 hours at a time       Eating Habits Over Last Week:  Did you take in 64 oz of non-caloric fluids? Between 50-60 ounce/d     Did you consume your prescribed meal replacement regimen each day?  Consuming 3-4 products a day plus grou turkey bake with pasta and cheese, fudge grahams (M, T), Lentils, cheesesticks with white meat chicken (W & T) and lentils and fish Friday       Physical Activity Over the Past Week:    Aerobic exercise: 0 min  Resistance exercise: 0 workouts / week

## 2018-04-02 NOTE — PROGRESS NOTES
Nurse note from patient's weekly VLCD / LCD / Maintenance class was reviewed. Pertinent medical concerns were:   none     BP Readings from Last 3 Encounters:   03/30/18 119/78   03/23/18 116/72   02/16/18 118/72       Weight Metrics 3/30/2018 3/23/2018 2/15/2018 2/9/2018 1/31/2018 1/25/2018 1/25/2018   Weight 262 lb 8 oz 264 lb 11.2 oz 258 lb 3.2 oz 258 lb 14.4 oz 262 lb 9.6 oz - 260 lb 4 oz   Body Fat % - - - - - 46.7 -   BMI 46.5 kg/m2 46.89 kg/m2 45.74 kg/m2 45.86 kg/m2 46.52 kg/m2 - 46.1 kg/m2       Current Outpatient Prescriptions   Medication Sig Dispense Refill    diethylpropion 25 mg tab Take 1 Tab by mouth two (2) times a day. Max Daily Amount: 2 Tabs. Indications: WEIGHT LOSS MANAGEMENT FOR OBESE PATIENT (BMI >= 30) 60 Tab 0    B.infantis-B.ani-B.long-B.bifi (PROBIOTIC 4X) 10-15 mg TbEC Take  by mouth.  multivitamin (ONE A DAY) tablet Take 1 Tab by mouth daily.  ergocalciferol (VITAMIN D2) 50,000 unit capsule Take 1 Cap by mouth every seven (7) days for 52 doses.  (Patient taking differently: Take 50,000 Units by mouth two (2) times a week.) 52 Cap 0     Plan:  Continue current plan

## 2018-04-05 ENCOUNTER — OFFICE VISIT (OUTPATIENT)
Dept: SURGERY | Age: 43
End: 2018-04-05

## 2018-04-05 VITALS — BODY MASS INDEX: 46.16 KG/M2 | WEIGHT: 260.5 LBS | HEIGHT: 63 IN

## 2018-04-05 DIAGNOSIS — E66.01 MORBID OBESITY WITH BMI OF 45.0-49.9, ADULT (HCC): Primary | ICD-10-CM

## 2018-04-05 NOTE — PROGRESS NOTES
Progress Note: Weekly Medical Monitoring in the [facility] Loss [facility]    Is there anything that you or the patient needs to let the supervising provider know about? no    Over the past week, have you experienced any side-effects? no    Marni Lindsay is a 40 y.o. female who is enrolled in [facility]    Hugo Borrero was prescribed the VLCD / LCD. Visit Vitals    Ht 5' 3\" (1.6 m)    Wt 118.2 kg (260 lb 8 oz)    BMI 46.15 kg/m2     Weight Metrics 4/5/2018 3/30/2018 3/23/2018 2/15/2018 2/9/2018 1/31/2018 1/25/2018   Weight 260 lb 8 oz 262 lb 8 oz 264 lb 11.2 oz 258 lb 3.2 oz 258 lb 14.4 oz 262 lb 9.6 oz -   Body Fat % - - - - - - 46.7   BMI 46.15 kg/m2 46.5 kg/m2 46.89 kg/m2 45.74 kg/m2 45.86 kg/m2 46.52 kg/m2 -         Have you received any other medical care this week? no  If yes, where and for what? Have you had any change in your medications since your last visit? no  If yes what? Did you have any problems adhering to the program last week? yes  If yes, please explain: holiday meal on easter      Eating Habits Over Last Week:  Did you take in 64 oz of non-caloric fluids? 40-60 ounces/d     Did you consume your prescribed meal replacement regimen each day?  yes       Physical Activity Over the Past Week:    Aerobic exercise: 0 min  Resistance exercise: 0 workouts / week

## 2018-04-12 ENCOUNTER — OFFICE VISIT (OUTPATIENT)
Dept: SURGERY | Age: 43
End: 2018-04-12

## 2018-04-12 DIAGNOSIS — E66.01 MORBID OBESITY WITH BMI OF 45.0-49.9, ADULT (HCC): Primary | ICD-10-CM

## 2018-04-16 VITALS
HEIGHT: 63 IN | SYSTOLIC BLOOD PRESSURE: 104 MMHG | WEIGHT: 262.8 LBS | BODY MASS INDEX: 46.56 KG/M2 | DIASTOLIC BLOOD PRESSURE: 68 MMHG | HEART RATE: 72 BPM

## 2018-04-16 NOTE — PROGRESS NOTES
Progress Note: Weekly Medical Monitoring in the [facility]    Is there anything that you or the patient needs to let the supervising provider know about? no    Over the past week, have you experienced any side-effects? no    Marni Lindsay is a 40 y.o. female who is enrolled in [facility]    Kevrin Capone was prescribed the VLCD / LCD. Visit Vitals    /68    Pulse 72    Ht 5' 3\" (1.6 m)    Wt 119.2 kg (262 lb 12.8 oz)    BMI 46.55 kg/m2     Weight Metrics 4/12/2018 4/5/2018 3/30/2018 3/23/2018 2/15/2018 2/9/2018 1/31/2018   Weight 262 lb 12.8 oz 260 lb 8 oz 262 lb 8 oz 264 lb 11.2 oz 258 lb 3.2 oz 258 lb 14.4 oz 262 lb 9.6 oz   Body Fat % - - - - - - -   BMI 46.55 kg/m2 46.15 kg/m2 46.5 kg/m2 46.89 kg/m2 45.74 kg/m2 45.86 kg/m2 46.52 kg/m2         Have you received any other medical care this week? no  If yes, where and for what? Have you had any change in your medications since your last visit? yes  If yes what? Pt added in a probiotic last week and starting magnesium citrate next week     Did you have any problems adhering to the program last week? yes  If yes, please explain: patient states she has been having dinner every night this week (instead of a product)        Eating Habits Over Last Week:  Did you take in 64 oz of non-caloric fluids? Between 20-60 ounces/d     Did you consume your prescribed meal replacement regimen each day?  Yes 2-3 products + a meal (dinner)        Physical Activity Over the Past Week:    Aerobic exercise: 0 min  Resistance exercise: 0 workouts / week

## 2018-04-16 NOTE — PROGRESS NOTES
Nurse note from patient's weekly VLCD / LCD / Maintenance class was reviewed. Pertinent medical concerns were:   none     BP Readings from Last 3 Encounters:   03/30/18 119/78   03/23/18 116/72   02/16/18 118/72       Weight Metrics 4/5/2018 3/30/2018 3/23/2018 2/15/2018 2/9/2018 1/31/2018 1/25/2018   Weight 260 lb 8 oz 262 lb 8 oz 264 lb 11.2 oz 258 lb 3.2 oz 258 lb 14.4 oz 262 lb 9.6 oz -   Body Fat % - - - - - - 46.7   BMI 46.15 kg/m2 46.5 kg/m2 46.89 kg/m2 45.74 kg/m2 45.86 kg/m2 46.52 kg/m2 -       Current Outpatient Prescriptions   Medication Sig Dispense Refill    diethylpropion 25 mg tab Take 1 Tab by mouth two (2) times a day. Max Daily Amount: 2 Tabs. Indications: WEIGHT LOSS MANAGEMENT FOR OBESE PATIENT (BMI >= 30) 60 Tab 0    B.infantis-B.ani-B.long-B.bifi (PROBIOTIC 4X) 10-15 mg TbEC Take  by mouth.  multivitamin (ONE A DAY) tablet Take 1 Tab by mouth daily.  ergocalciferol (VITAMIN D2) 50,000 unit capsule Take 1 Cap by mouth every seven (7) days for 52 doses.  (Patient taking differently: Take 50,000 Units by mouth two (2) times a week.) 52 Cap 0     Plan:  Continue current regimen

## 2018-04-17 NOTE — PROGRESS NOTES
Nurse note from patient's weekly VLCD / LCD / Maintenance class was reviewed. Pertinent medical concerns were:   none     BP Readings from Last 3 Encounters:   04/16/18 104/68   03/30/18 119/78   03/23/18 116/72       Weight Metrics 4/12/2018 4/5/2018 3/30/2018 3/23/2018 2/15/2018 2/9/2018 1/31/2018   Weight 262 lb 12.8 oz 260 lb 8 oz 262 lb 8 oz 264 lb 11.2 oz 258 lb 3.2 oz 258 lb 14.4 oz 262 lb 9.6 oz   Body Fat % - - - - - - -   BMI 46.55 kg/m2 46.15 kg/m2 46.5 kg/m2 46.89 kg/m2 45.74 kg/m2 45.86 kg/m2 46.52 kg/m2       Current Outpatient Prescriptions   Medication Sig Dispense Refill    diethylpropion 25 mg tab Take 1 Tab by mouth two (2) times a day. Max Daily Amount: 2 Tabs. Indications: WEIGHT LOSS MANAGEMENT FOR OBESE PATIENT (BMI >= 30) 60 Tab 0    B.infantis-B.ani-B.long-B.bifi (PROBIOTIC 4X) 10-15 mg TbEC Take  by mouth.  multivitamin (ONE A DAY) tablet Take 1 Tab by mouth daily.  ergocalciferol (VITAMIN D2) 50,000 unit capsule Take 1 Cap by mouth every seven (7) days for 52 doses.  (Patient taking differently: Take 50,000 Units by mouth two (2) times a week.) 52 Cap 0     Plan:    Continue current regimen

## 2018-04-19 ENCOUNTER — OFFICE VISIT (OUTPATIENT)
Dept: SURGERY | Age: 43
End: 2018-04-19

## 2018-04-19 DIAGNOSIS — E66.01 MORBID OBESITY WITH BMI OF 45.0-49.9, ADULT (HCC): Primary | ICD-10-CM

## 2018-04-23 VITALS — HEIGHT: 63 IN | BODY MASS INDEX: 46.58 KG/M2 | WEIGHT: 262.9 LBS

## 2018-04-23 NOTE — PROGRESS NOTES
Nurse note from patient's weekly VLCD / LCD / Maintenance class was reviewed. Pertinent medical concerns were:   none     BP Readings from Last 3 Encounters:   04/16/18 104/68   03/30/18 119/78   03/23/18 116/72       Weight Metrics 4/19/2018 4/12/2018 4/5/2018 3/30/2018 3/23/2018 2/15/2018 2/9/2018   Weight 262 lb 14.4 oz 262 lb 12.8 oz 260 lb 8 oz 262 lb 8 oz 264 lb 11.2 oz 258 lb 3.2 oz 258 lb 14.4 oz   Body Fat % - - - - - - -   BMI 46.57 kg/m2 46.55 kg/m2 46.15 kg/m2 46.5 kg/m2 46.89 kg/m2 45.74 kg/m2 45.86 kg/m2       Current Outpatient Prescriptions   Medication Sig Dispense Refill    diethylpropion 25 mg tab Take 1 Tab by mouth two (2) times a day. Max Daily Amount: 2 Tabs. Indications: WEIGHT LOSS MANAGEMENT FOR OBESE PATIENT (BMI >= 30) 60 Tab 0    B.infantis-B.ani-B.long-B.bifi (PROBIOTIC 4X) 10-15 mg TbEC Take  by mouth.  multivitamin (ONE A DAY) tablet Take 1 Tab by mouth daily.  ergocalciferol (VITAMIN D2) 50,000 unit capsule Take 1 Cap by mouth every seven (7) days for 52 doses.  (Patient taking differently: Take 50,000 Units by mouth two (2) times a week.) 52 Cap 0       Plan:  Continue current regimen

## 2018-04-23 NOTE — PROGRESS NOTES
Progress Note: Weekly Medical Monitoring in the [facility]    Is there anything that you or the patient needs to let the supervising provider know about? no    Over the past week, have you experienced any side-effects? no    Marni Lindsay is a 40 y.o. female who is enrolled in [facility]    Hugo Borrero was prescribed the VLCD / LCD. Visit Vitals    Ht 5' 3\" (1.6 m)    Wt 119.3 kg (262 lb 14.4 oz)    BMI 46.57 kg/m2     Weight Metrics 4/19/2018 4/12/2018 4/5/2018 3/30/2018 3/23/2018 2/15/2018 2/9/2018   Weight 262 lb 14.4 oz 262 lb 12.8 oz 260 lb 8 oz 262 lb 8 oz 264 lb 11.2 oz 258 lb 3.2 oz 258 lb 14.4 oz   Body Fat % - - - - - - -   BMI 46.57 kg/m2 46.55 kg/m2 46.15 kg/m2 46.5 kg/m2 46.89 kg/m2 45.74 kg/m2 45.86 kg/m2         Have you received any other medical care this week? no  If yes, where and for what? Have you had any change in your medications since your last visit? no  If yes what? Did you have any problems adhering to the program last week? no  If yes, please explain:       Eating Habits Over Last Week:  Did you take in 64 oz of non-caloric fluids? yes     Did you consume your prescribed meal replacement regimen each day?  Yes, patient does 2-3 products a day with dinner        Physical Activity Over the Past Week:    Aerobic exercise: 0 min  Resistance exercise: 0 workouts / week